# Patient Record
Sex: FEMALE | Race: WHITE | NOT HISPANIC OR LATINO
[De-identification: names, ages, dates, MRNs, and addresses within clinical notes are randomized per-mention and may not be internally consistent; named-entity substitution may affect disease eponyms.]

---

## 2018-08-13 PROBLEM — Z00.00 ENCOUNTER FOR PREVENTIVE HEALTH EXAMINATION: Status: ACTIVE | Noted: 2018-08-13

## 2018-08-15 ENCOUNTER — RECORD ABSTRACTING (OUTPATIENT)
Age: 83
End: 2018-08-15

## 2018-08-15 DIAGNOSIS — Z82.61 FAMILY HISTORY OF ARTHRITIS: ICD-10-CM

## 2018-08-15 DIAGNOSIS — Z87.59 PERSONAL HISTORY OF OTHER COMPLICATIONS OF PREGNANCY, CHILDBIRTH AND THE PUERPERIUM: ICD-10-CM

## 2018-08-15 DIAGNOSIS — Z84.1 FAMILY HISTORY OF DISORDERS OF KIDNEY AND URETER: ICD-10-CM

## 2018-08-15 DIAGNOSIS — K62.3 RECTAL PROLAPSE: ICD-10-CM

## 2018-08-15 DIAGNOSIS — Z87.891 PERSONAL HISTORY OF NICOTINE DEPENDENCE: ICD-10-CM

## 2018-08-15 DIAGNOSIS — Z86.19 PERSONAL HISTORY OF OTHER INFECTIOUS AND PARASITIC DISEASES: ICD-10-CM

## 2018-08-15 DIAGNOSIS — Z87.81 PERSONAL HISTORY OF (HEALED) TRAUMATIC FRACTURE: ICD-10-CM

## 2018-08-15 RX ORDER — ASPIRIN 81 MG
81 TABLET, DELAYED RELEASE (ENTERIC COATED) ORAL
Refills: 0 | Status: ACTIVE | COMMUNITY

## 2018-08-15 RX ORDER — BLOOD SUGAR DIAGNOSTIC
STRIP MISCELLANEOUS
Refills: 0 | Status: ACTIVE | COMMUNITY

## 2018-08-15 RX ORDER — SYRINGE AND NEEDLE,INSULIN,1ML 31 GX5/16"
SYRINGE, EMPTY DISPOSABLE MISCELLANEOUS
Refills: 0 | Status: ACTIVE | COMMUNITY

## 2018-08-15 RX ORDER — PNV NO.95/FERROUS FUM/FOLIC AC 28MG-0.8MG
325 TABLET ORAL
Refills: 0 | Status: ACTIVE | COMMUNITY

## 2018-08-15 RX ORDER — PANTOPRAZOLE 40 MG/1
40 TABLET, DELAYED RELEASE ORAL
Refills: 0 | Status: ACTIVE | COMMUNITY

## 2018-08-20 ENCOUNTER — APPOINTMENT (OUTPATIENT)
Age: 83
End: 2018-08-20

## 2018-08-28 ENCOUNTER — RECORD ABSTRACTING (OUTPATIENT)
Age: 83
End: 2018-08-28

## 2018-08-28 ENCOUNTER — APPOINTMENT (OUTPATIENT)
Dept: ENDOCRINOLOGY | Facility: CLINIC | Age: 83
End: 2018-08-28
Payer: MEDICARE

## 2018-08-28 VITALS
BODY MASS INDEX: 18.61 KG/M2 | DIASTOLIC BLOOD PRESSURE: 68 MMHG | WEIGHT: 109 LBS | SYSTOLIC BLOOD PRESSURE: 128 MMHG | HEIGHT: 64 IN | HEART RATE: 91 BPM

## 2018-08-28 LAB — GLUCOSE BLDC GLUCOMTR-MCNC: 161

## 2018-08-28 PROCEDURE — 82962 GLUCOSE BLOOD TEST: CPT

## 2018-08-28 PROCEDURE — 99214 OFFICE O/P EST MOD 30 MIN: CPT | Mod: 25

## 2018-12-11 ENCOUNTER — RECORD ABSTRACTING (OUTPATIENT)
Age: 83
End: 2018-12-11

## 2019-02-08 ENCOUNTER — LABORATORY RESULT (OUTPATIENT)
Age: 84
End: 2019-02-08

## 2019-02-08 ENCOUNTER — APPOINTMENT (OUTPATIENT)
Dept: ENDOCRINOLOGY | Facility: CLINIC | Age: 84
End: 2019-02-08
Payer: MEDICARE

## 2019-02-08 VITALS
SYSTOLIC BLOOD PRESSURE: 122 MMHG | OXYGEN SATURATION: 97 % | BODY MASS INDEX: 18.61 KG/M2 | HEART RATE: 86 BPM | HEIGHT: 64 IN | DIASTOLIC BLOOD PRESSURE: 80 MMHG | WEIGHT: 109 LBS

## 2019-02-08 LAB — GLUCOSE BLDC GLUCOMTR-MCNC: 137

## 2019-02-08 PROCEDURE — 99214 OFFICE O/P EST MOD 30 MIN: CPT | Mod: 25

## 2019-02-08 PROCEDURE — 82962 GLUCOSE BLOOD TEST: CPT

## 2019-02-08 NOTE — HISTORY OF PRESENT ILLNESS
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 difficile colitis\par Hypothyroidism on Synthroid 0.112\par Status post CVA causing left arm weakness

## 2019-02-08 NOTE — ASSESSMENT
[FreeTextEntry1] : DM type 2, likely controlled, needs verification by lab tests.\par Labs pending to check thyroid levels\par Remains on rx for osteoporosis

## 2019-02-08 NOTE — PHYSICAL EXAM
[Thyroid Not Enlarged] : the thyroid was not enlarged [Clear to Auscultation] : lungs were clear to auscultation bilaterally [Regular Rhythm] : with a regular rhythm [de-identified] : frail appearing

## 2019-02-09 LAB
ALBUMIN SERPL ELPH-MCNC: 4.3 G/DL
ALP BLD-CCNC: 82 U/L
ALT SERPL-CCNC: 15 U/L
ANION GAP SERPL CALC-SCNC: 16 MMOL/L
AST SERPL-CCNC: 17 U/L
BASOPHILS # BLD AUTO: 0.04 K/UL
BASOPHILS NFR BLD AUTO: 0.5 %
BILIRUB SERPL-MCNC: 0.2 MG/DL
BUN SERPL-MCNC: 33 MG/DL
CALCIUM SERPL-MCNC: 9.7 MG/DL
CHLORIDE SERPL-SCNC: 105 MMOL/L
CHOLEST SERPL-MCNC: 166 MG/DL
CHOLEST/HDLC SERPL: 4.3 RATIO
CO2 SERPL-SCNC: 18 MMOL/L
CREAT SERPL-MCNC: 0.85 MG/DL
EOSINOPHIL # BLD AUTO: 0.4 K/UL
EOSINOPHIL NFR BLD AUTO: 5 %
GLUCOSE SERPL-MCNC: 137 MG/DL
HBA1C MFR BLD HPLC: 6.8 %
HCT VFR BLD CALC: 33.7 %
HDLC SERPL-MCNC: 39 MG/DL
HGB BLD-MCNC: 10 G/DL
IMM GRANULOCYTES NFR BLD AUTO: 0.1 %
LDLC SERPL CALC-MCNC: 95 MG/DL
LYMPHOCYTES # BLD AUTO: 1.17 K/UL
LYMPHOCYTES NFR BLD AUTO: 14.6 %
MAN DIFF?: NORMAL
MCHC RBC-ENTMCNC: 29.7 GM/DL
MCHC RBC-ENTMCNC: 29.9 PG
MCV RBC AUTO: 100.9 FL
MONOCYTES # BLD AUTO: 0.79 K/UL
MONOCYTES NFR BLD AUTO: 9.8 %
NEUTROPHILS # BLD AUTO: 5.62 K/UL
NEUTROPHILS NFR BLD AUTO: 70 %
PLATELET # BLD AUTO: 369 K/UL
POTASSIUM SERPL-SCNC: 4.5 MMOL/L
PROT SERPL-MCNC: 7 G/DL
RBC # BLD: 3.34 M/UL
RBC # FLD: 16.4 %
SODIUM SERPL-SCNC: 139 MMOL/L
TRIGL SERPL-MCNC: 161 MG/DL
WBC # FLD AUTO: 8.03 K/UL

## 2019-02-10 LAB
T3RU NFR SERPL: 0.78 INDEX
T4 SERPL-MCNC: 5.1 UG/DL
TSH SERPL-ACNC: 0.61 UIU/ML

## 2019-05-02 ENCOUNTER — RX RENEWAL (OUTPATIENT)
Age: 84
End: 2019-05-02

## 2019-05-31 ENCOUNTER — LABORATORY RESULT (OUTPATIENT)
Age: 84
End: 2019-05-31

## 2019-05-31 ENCOUNTER — APPOINTMENT (OUTPATIENT)
Dept: ENDOCRINOLOGY | Facility: CLINIC | Age: 84
End: 2019-05-31
Payer: MEDICARE

## 2019-05-31 VITALS
BODY MASS INDEX: 19.97 KG/M2 | HEIGHT: 64 IN | HEART RATE: 70 BPM | DIASTOLIC BLOOD PRESSURE: 50 MMHG | SYSTOLIC BLOOD PRESSURE: 110 MMHG | WEIGHT: 117 LBS

## 2019-05-31 LAB — GLUCOSE BLDC GLUCOMTR-MCNC: 286

## 2019-05-31 PROCEDURE — 36415 COLL VENOUS BLD VENIPUNCTURE: CPT

## 2019-05-31 PROCEDURE — 82962 GLUCOSE BLOOD TEST: CPT

## 2019-05-31 PROCEDURE — 99214 OFFICE O/P EST MOD 30 MIN: CPT | Mod: 25

## 2019-05-31 RX ORDER — ZOLPIDEM TARTRATE 5 MG/1
5 TABLET ORAL
Refills: 0 | Status: DISCONTINUED | COMMUNITY
End: 2019-05-31

## 2019-05-31 RX ORDER — ALENDRONATE SODIUM 70 MG/1
70 TABLET ORAL
Refills: 0 | Status: DISCONTINUED | COMMUNITY
End: 2019-05-31

## 2019-05-31 RX ORDER — GLIPIZIDE AND METFORMIN HYDROCHLORIDE 5; 500 MG/1; MG/1
5-500 TABLET, FILM COATED ORAL
Refills: 0 | Status: DISCONTINUED | COMMUNITY
End: 2019-05-31

## 2019-05-31 RX ORDER — BLOOD SUGAR DIAGNOSTIC
STRIP MISCELLANEOUS
Refills: 0 | Status: DISCONTINUED | COMMUNITY
End: 2019-05-31

## 2019-05-31 RX ORDER — LANCETS 33 GAUGE
EACH MISCELLANEOUS
Refills: 0 | Status: DISCONTINUED | COMMUNITY
End: 2019-05-31

## 2019-05-31 NOTE — ASSESSMENT
[FreeTextEntry1] : DM type 2; possible loss of control due to discontinuation of metformin. Will check labs to see if metformin needs to be resumed, possibly in the ER version\par hypothyroid, clinically euthyroid;\par update thyroid levels

## 2019-05-31 NOTE — HISTORY OF PRESENT ILLNESS
[FreeTextEntry1] : \par Her current diabetic regimen is as follows:\par glipizide/ metformin 5/500 b.i.d. - metformin stopoped due to diarrhea, but with unclear reduction in symptoms\par januvia 100 mg daily\par levemir 10 units daily\par She was on Actos in the past \par \par s/p left foot infection requiring amputation of first toe\par moved to assisted living\par \par past medical history\par Osteoporosis with compression fracture in the spine\par ASHD, status post TAVR.\par Status post C. difficile colitis\par Hypothyroidism on Synthroid 0.112\par Status post CVA causing left arm weakness

## 2019-06-05 LAB
ALBUMIN SERPL ELPH-MCNC: 4.1 G/DL
ALP BLD-CCNC: 64 U/L
ALT SERPL-CCNC: 26 U/L
ANION GAP SERPL CALC-SCNC: 13 MMOL/L
AST SERPL-CCNC: 18 U/L
BASOPHILS # BLD AUTO: 0.07 K/UL
BASOPHILS NFR BLD AUTO: 0.9 %
BILIRUB SERPL-MCNC: 0.2 MG/DL
BUN SERPL-MCNC: 20 MG/DL
CALCIUM SERPL-MCNC: 9.5 MG/DL
CHLORIDE SERPL-SCNC: 104 MMOL/L
CHOLEST SERPL-MCNC: 184 MG/DL
CHOLEST/HDLC SERPL: 5.6 RATIO
CO2 SERPL-SCNC: 23 MMOL/L
CREAT SERPL-MCNC: 0.72 MG/DL
EOSINOPHIL # BLD AUTO: 0.54 K/UL
EOSINOPHIL NFR BLD AUTO: 7 %
ESTIMATED AVERAGE GLUCOSE: 128 MG/DL
GLUCOSE SERPL-MCNC: 260 MG/DL
HBA1C MFR BLD HPLC: 6.1 %
HCT VFR BLD CALC: 29.9 %
HDLC SERPL-MCNC: 33 MG/DL
HGB BLD-MCNC: 9 G/DL
LDLC SERPL CALC-MCNC: 111 MG/DL
LYMPHOCYTES # BLD AUTO: 1.02 K/UL
LYMPHOCYTES NFR BLD AUTO: 13.2 %
MAN DIFF?: NORMAL
MCHC RBC-ENTMCNC: 30.1 GM/DL
MCHC RBC-ENTMCNC: 33.3 PG
MCV RBC AUTO: 110.7 FL
MONOCYTES # BLD AUTO: 0.27 K/UL
MONOCYTES NFR BLD AUTO: 3.5 %
NEUTROPHILS # BLD AUTO: 5.83 K/UL
NEUTROPHILS NFR BLD AUTO: 75.4 %
PLATELET # BLD AUTO: 253 K/UL
POTASSIUM SERPL-SCNC: 5 MMOL/L
PROT SERPL-MCNC: 6.7 G/DL
RBC # BLD: 2.7 M/UL
RBC # FLD: 16.8 %
SODIUM SERPL-SCNC: 140 MMOL/L
T3RU NFR SERPL: 0.8 TBI
T4 SERPL-MCNC: 5.7 UG/DL
TRIGL SERPL-MCNC: 200 MG/DL
TSH SERPL-ACNC: 1.45 UIU/ML
WBC # FLD AUTO: 7.73 K/UL

## 2019-07-10 ENCOUNTER — MEDICATION RENEWAL (OUTPATIENT)
Age: 84
End: 2019-07-10

## 2019-07-11 ENCOUNTER — MEDICATION RENEWAL (OUTPATIENT)
Age: 84
End: 2019-07-11

## 2019-07-11 RX ORDER — LANCETS 28 GAUGE
EACH MISCELLANEOUS
Qty: 200 | Refills: 1 | Status: ACTIVE | COMMUNITY

## 2019-07-11 RX ORDER — BLOOD-GLUCOSE METER
KIT MISCELLANEOUS
Qty: 1 | Refills: 0 | Status: ACTIVE | COMMUNITY
Start: 2019-07-10 | End: 1900-01-01

## 2019-10-04 ENCOUNTER — APPOINTMENT (OUTPATIENT)
Dept: ENDOCRINOLOGY | Facility: CLINIC | Age: 84
End: 2019-10-04
Payer: MEDICARE

## 2019-10-04 VITALS
SYSTOLIC BLOOD PRESSURE: 112 MMHG | DIASTOLIC BLOOD PRESSURE: 70 MMHG | OXYGEN SATURATION: 96 % | HEIGHT: 63 IN | BODY MASS INDEX: 20.38 KG/M2 | HEART RATE: 61 BPM | WEIGHT: 115 LBS

## 2019-10-04 LAB — GLUCOSE BLDC GLUCOMTR-MCNC: 194

## 2019-10-04 PROCEDURE — 82962 GLUCOSE BLOOD TEST: CPT

## 2019-10-04 PROCEDURE — 99214 OFFICE O/P EST MOD 30 MIN: CPT | Mod: 25

## 2019-10-04 RX ORDER — MIRABEGRON 50 MG/1
50 TABLET, FILM COATED, EXTENDED RELEASE ORAL
Refills: 0 | Status: ACTIVE | COMMUNITY

## 2019-10-04 RX ORDER — GABAPENTIN 300 MG
300 TABLET ORAL
Refills: 0 | Status: ACTIVE | COMMUNITY

## 2019-10-04 RX ORDER — LEVOTHYROXINE SODIUM 0.1 MG/1
100 TABLET ORAL
Refills: 0 | Status: ACTIVE | COMMUNITY

## 2019-10-04 RX ORDER — CLOPIDOGREL BISULFATE 75 MG/1
75 TABLET, FILM COATED ORAL
Refills: 0 | Status: ACTIVE | COMMUNITY

## 2019-10-04 NOTE — HISTORY OF PRESENT ILLNESS
[FreeTextEntry1] : \par Her current diabetic regimen is as follows:\par glipizide/ metformin 5/500 b.i.d. - metformin stopped due to diarrhea, but with unclear reduction in symptoms\par januvia 50 mg daily\par levemir 10 units daily\par glipizide ER 5 tid\par She was on Actos in the past \par \par s/p left foot infection requiring amputation of first toe\par moved to assisted living\par \par past medical history\par Osteoporosis with compression fracture in the spine\par ASHD, status post TAVR.\par Status post C. difficile colitis\par Hypothyroidism on Synthroid 0.1\par Status post CVA causing left arm weakness

## 2019-10-04 NOTE — ASSESSMENT
[FreeTextEntry1] : DM type 2, stable control but with elevation post-supper. Advise increase in januvia to 100 mg daily\par hypothyroid, clinically euthyroid on replacement\par \par updated labs requested

## 2019-11-12 ENCOUNTER — MEDICATION RENEWAL (OUTPATIENT)
Age: 84
End: 2019-11-12

## 2019-12-30 ENCOUNTER — RX RENEWAL (OUTPATIENT)
Age: 84
End: 2019-12-30

## 2019-12-30 RX ORDER — BLOOD SUGAR DIAGNOSTIC
STRIP MISCELLANEOUS
Qty: 100 | Refills: 2 | Status: ACTIVE | COMMUNITY
Start: 2019-05-02 | End: 1900-01-01

## 2020-01-17 ENCOUNTER — RX RENEWAL (OUTPATIENT)
Age: 85
End: 2020-01-17

## 2020-02-28 ENCOUNTER — APPOINTMENT (OUTPATIENT)
Dept: ENDOCRINOLOGY | Facility: CLINIC | Age: 85
End: 2020-02-28
Payer: MEDICARE

## 2020-02-28 VITALS
DIASTOLIC BLOOD PRESSURE: 64 MMHG | SYSTOLIC BLOOD PRESSURE: 110 MMHG | WEIGHT: 115 LBS | HEIGHT: 63 IN | HEART RATE: 60 BPM | BODY MASS INDEX: 20.38 KG/M2

## 2020-02-28 LAB — GLUCOSE BLDC GLUCOMTR-MCNC: 167

## 2020-02-28 PROCEDURE — 99214 OFFICE O/P EST MOD 30 MIN: CPT | Mod: 25

## 2020-02-28 PROCEDURE — 82962 GLUCOSE BLOOD TEST: CPT

## 2020-02-28 RX ORDER — FAMOTIDINE 20 MG/1
20 TABLET, FILM COATED ORAL
Refills: 0 | Status: ACTIVE | COMMUNITY

## 2020-02-28 RX ORDER — FUROSEMIDE 40 MG/1
40 TABLET ORAL
Refills: 0 | Status: ACTIVE | COMMUNITY

## 2020-02-28 RX ORDER — METOPROLOL SUCCINATE 50 MG/1
50 TABLET, EXTENDED RELEASE ORAL
Refills: 0 | Status: ACTIVE | COMMUNITY

## 2020-02-28 NOTE — HISTORY OF PRESENT ILLNESS
[FreeTextEntry1] : \par Her current diabetic regimen is as follows:\par glipizide/ metformin 5/500 b.i.d. - metformin stopped due to diarrhea, but with unclear reduction in symptoms\par januvia 50 mg daily\par levemir 20 units daily\par glipizide ER 5 tid\par She was on Actos in the past \par \par s/p left foot infection requiring amputation of first toe\par moved to assisted living\par \par past medical history\par Osteoporosis with compression fracture in the spine\par ASHD, status post TAVR.\par Status post C. difficile colitis\par Hypothyroidism on Synthroid 0.1\par Status post CVA causing left arm weakness

## 2020-02-28 NOTE — ASSESSMENT
[FreeTextEntry1] : DM type 2, likely requiring adjustments in regimen. Suspect basal insulin dose excessive.\par hypothyroid, clinically euthyroid on replacement\par \par updated labs and glucose review requested

## 2020-02-28 NOTE — PHYSICAL EXAM
[Thyroid Not Enlarged] : the thyroid was not enlarged [Regular Rhythm] : with a regular rhythm [Clear to Auscultation] : lungs were clear to auscultation bilaterally [de-identified] : frail, facial ecchymoses

## 2020-03-05 ENCOUNTER — APPOINTMENT (OUTPATIENT)
Dept: ENDOCRINOLOGY | Facility: CLINIC | Age: 85
End: 2020-03-05

## 2020-04-15 ENCOUNTER — RX RENEWAL (OUTPATIENT)
Age: 85
End: 2020-04-15

## 2020-06-05 ENCOUNTER — APPOINTMENT (OUTPATIENT)
Dept: ENDOCRINOLOGY | Facility: CLINIC | Age: 85
End: 2020-06-05
Payer: MEDICARE

## 2020-06-05 VITALS
HEIGHT: 63 IN | HEART RATE: 58 BPM | BODY MASS INDEX: 18.61 KG/M2 | WEIGHT: 105 LBS | DIASTOLIC BLOOD PRESSURE: 70 MMHG | SYSTOLIC BLOOD PRESSURE: 118 MMHG

## 2020-06-05 LAB — GLUCOSE BLDC GLUCOMTR-MCNC: 363

## 2020-06-05 PROCEDURE — 82962 GLUCOSE BLOOD TEST: CPT

## 2020-06-05 PROCEDURE — 99214 OFFICE O/P EST MOD 30 MIN: CPT | Mod: 25

## 2020-06-05 NOTE — ASSESSMENT
[FreeTextEntry1] : DM type 2, with morning borderline hypoglycemia. Will decrease levemir further to 14 units\par hypothyroid, clinically euthyroid on replacement\par \par

## 2020-06-05 NOTE — HISTORY OF PRESENT ILLNESS
[FreeTextEntry1] : \par Her current diabetic regimen is as follows:\par \par januvia 100 mg daily\par levemir 16 units daily\par glipizide ER 5 bid\par She was on Actos in the past \par \par s/p left foot infection requiring amputation of first toe\par moved to assisted living\par \par past medical history\par Osteoporosis with compression fracture in the spine\par ASHD, status post TAVR.\par Status post C. difficile colitis\par Hypothyroidism on Synthroid 0.1\par Status post CVA causing left arm weakness

## 2020-06-05 NOTE — PHYSICAL EXAM
[Alert] : alert [No Thyroid Nodules] : no palpable thyroid nodules [No Acute Distress] : no acute distress [Clear to Auscultation] : lungs were clear to auscultation bilaterally [Normal Rate] : heart rate was normal

## 2020-08-27 ENCOUNTER — RX RENEWAL (OUTPATIENT)
Age: 85
End: 2020-08-27

## 2020-08-27 RX ORDER — GLIPIZIDE 5 MG/1
5 TABLET, FILM COATED, EXTENDED RELEASE ORAL
Qty: 270 | Refills: 1 | Status: ACTIVE | COMMUNITY
Start: 2020-01-17 | End: 1900-01-01

## 2020-09-09 ENCOUNTER — RX RENEWAL (OUTPATIENT)
Age: 85
End: 2020-09-09

## 2020-10-07 ENCOUNTER — APPOINTMENT (OUTPATIENT)
Dept: ENDOCRINOLOGY | Facility: CLINIC | Age: 85
End: 2020-10-07
Payer: MEDICARE

## 2020-10-07 PROCEDURE — 99442: CPT | Mod: 95

## 2020-10-07 NOTE — ASSESSMENT
[FreeTextEntry1] : DM type 2, insulin treated. Review of meds and glucose levels needed, as well as lab review to determine treatment needs.

## 2020-10-07 NOTE — HISTORY OF PRESENT ILLNESS
[Other Location: e.g. School (Enter Location, City,State)___] : at [unfilled], at the time of the visit. [Other Location: e.g. 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 difficile colitis\par Hypothyroidism on Synthroid 0.1\par Status post CVA causing left arm weakness

## 2020-10-26 ENCOUNTER — RX RENEWAL (OUTPATIENT)
Age: 85
End: 2020-10-26

## 2020-12-07 ENCOUNTER — RX RENEWAL (OUTPATIENT)
Age: 85
End: 2020-12-07

## 2021-02-05 ENCOUNTER — APPOINTMENT (OUTPATIENT)
Dept: ENDOCRINOLOGY | Facility: CLINIC | Age: 86
End: 2021-02-05
Payer: MEDICARE

## 2021-02-05 ENCOUNTER — RESULT CHARGE (OUTPATIENT)
Age: 86
End: 2021-02-05

## 2021-02-05 VITALS
WEIGHT: 110 LBS | BODY MASS INDEX: 19.49 KG/M2 | SYSTOLIC BLOOD PRESSURE: 122 MMHG | DIASTOLIC BLOOD PRESSURE: 74 MMHG | HEIGHT: 63 IN | HEART RATE: 76 BPM

## 2021-02-05 LAB — GLUCOSE BLDC GLUCOMTR-MCNC: 384

## 2021-02-05 PROCEDURE — 82962 GLUCOSE BLOOD TEST: CPT

## 2021-02-05 PROCEDURE — 99214 OFFICE O/P EST MOD 30 MIN: CPT | Mod: 25

## 2021-02-05 RX ORDER — FAMOTIDINE 20 MG/1
20 TABLET, FILM COATED ORAL
Refills: 0 | Status: ACTIVE | COMMUNITY

## 2021-02-05 RX ORDER — FOLIC ACID 1 MG/1
1 TABLET ORAL
Refills: 0 | Status: ACTIVE | COMMUNITY

## 2021-02-05 RX ORDER — FUROSEMIDE 20 MG/1
20 TABLET ORAL
Refills: 0 | Status: ACTIVE | COMMUNITY

## 2021-02-05 RX ORDER — CHROMIUM 200 MCG
TABLET ORAL
Refills: 0 | Status: ACTIVE | COMMUNITY

## 2021-02-05 NOTE — REVIEW OF SYSTEMS
[Recent Weight Gain (___ Lbs)] : recent weight gain: [unfilled] lbs [Recent Weight Loss (___ Lbs)] : recent weight loss: [unfilled] lbs [Fatigue] : no fatigue [Decreased Appetite] : appetite not decreased [Visual Field Defect] : no visual field defect [Blurred Vision] : no blurred vision [Dysphagia] : no dysphagia [Neck Pain] : no neck pain [Dysphonia] : no dysphonia [Chest Pain] : no chest pain [Palpitations] : no palpitations [Constipation] : no constipation [Diarrhea] : no diarrhea [Polyuria] : no polyuria [Dysuria] : no dysuria [Headaches] : no headaches [Tremors] : no tremors [Depression] : no depression [Anxiety] : no anxiety [Polydipsia] : no polydipsia [Swelling] : no swelling

## 2021-02-05 NOTE — ASSESSMENT
[FreeTextEntry1] : 89 y/o female with Type 2 DM, Hypothyroidism, and Hypercholesterolemia.\par \par Plan: \par Type 2 DM: elevated blood sugars most likely r/t foot infection \par - increase Levemir to 16 units at bedtime\par - requested recent labs and blood sugar logs from [facility] living \par \par Hypothyroidism: check TFTs now\par - continue current dose of LT4 \par \par Hypercholesterolemia: check lipids now, continue to follow a low fat diet \par \par RTO in 2 weeks to see CDE for blood sugar log review then another 4 weeks in office. \par

## 2021-02-05 NOTE — PHYSICAL EXAM
[Alert] : alert [No Acute Distress] : no acute distress [Normal Sclera/Conjunctiva] : normal sclera/conjunctiva [EOMI] : extra ocular movement intact [No LAD] : no lymphadenopathy [Thyroid Not Enlarged] : the thyroid was not enlarged [No Thyroid Nodules] : no palpable thyroid nodules [No Respiratory Distress] : no respiratory distress [No Accessory Muscle Use] : no accessory muscle use [Normal Rate and Effort] : normal respiratory rate and effort [Clear to Auscultation] : lungs were clear to auscultation bilaterally [Normal S1, S2] : normal S1 and S2 [Normal Rate] : heart rate was normal [Regular Rhythm] : with a regular rhythm [No Edema] : no peripheral edema [Normal Bowel Sounds] : normal bowel sounds [Not Tender] : non-tender [Soft] : abdomen soft [No Rash] : no rash [Acanthosis Nigricans] : no acanthosis nigricans [No Tremors] : no tremors [Oriented x3] : oriented to person, place, and time [Normal Affect] : the affect was normal [Normal Insight/Judgement] : insight and judgment were intact [Normal Mood] : the mood was normal [de-identified] : frail  [de-identified] : ambulates with rolling walker

## 2021-02-05 NOTE — HISTORY OF PRESENT ILLNESS
[FreeTextEntry1] : Lives at [facility] living [facility] \par Pt. reports she just finished Keflex for foot infection \par \par Quality: Type 2 DM\par Severity: moderate \par Modifying Factors: Better with medication \par \par Current Regimen:\par januvia 100 mg daily\par levemir 14 units daily\par glipizide ER 5 bid\par She was on Actos in the past \par \par Self blood sugar monitoring: 3 times a day, no logs or meter\par current \par \par exercise: none\par \par Diet: 3 meals a day\par \par \par Date of last eye exam: none recently (-) DR\par Date of last foot exam: unsure, s/p left foot infection requiring amputation of first toe\par Date of last flu vaccine: 2020\par Date of last Pneumovax: 2020\par COVID: 2020 received both injections\par \par past medical history\par Osteoporosis with compression fracture in the spine\par ASHD, status post TAVR.\par Status post C. difficile colitis\par Hypothyroidism on Synthroid 0.1\par Status post CVA causing left arm weakness

## 2021-02-23 ENCOUNTER — APPOINTMENT (OUTPATIENT)
Dept: ENDOCRINOLOGY | Facility: CLINIC | Age: 86
End: 2021-02-23
Payer: MEDICARE

## 2021-02-23 DIAGNOSIS — E78.00 PURE HYPERCHOLESTEROLEMIA, UNSPECIFIED: ICD-10-CM

## 2021-02-23 LAB
HBA1C MFR BLD HPLC: 8.5
LDLC SERPL DIRECT ASSAY-MCNC: 53

## 2021-02-23 PROCEDURE — 99443: CPT | Mod: 95

## 2021-02-23 NOTE — HISTORY OF PRESENT ILLNESS
[Other Location: e.g. School (Enter Location, City,State)___] : at [unfilled], at the time of the visit. [Other Location: e.g. Home (Enter Location, City,State)___] : at [unfilled] [Verbal consent obtained from patient] : the patient, [unfilled] [FreeTextEntry1] : Lives at [facility] \par Pt. reports she just finished Keflex for foot infection \par \par Quality: Type 2 DM\par Severity: moderate \par Modifying Factors: Better with medication \par \par Current Regimen:\par januvia 100 mg daily\par levemir 16 units daily\par glipizide ER 5 bid\par She was on Actos in the past \par \par Self blood sugar monitoring: 3 times a day, no logs or meter - will obtain from [facility] \par this morning \par \par exercise: none\par \par Diet: 3 meals a day\par \par \par Date of last eye exam: none recently (-) DR\par Date of last foot exam: unsure, s/p left foot infection requiring amputation of first toe, \par Date of last flu vaccine: 2020\par Date of last Pneumovax: 2020\par COVID: 2020 received both injections\par \par past medical history\par Osteoporosis with compression fracture in the spine\par ASHD, status post TAVR.\par Status post C. difficile colitis\par Hypothyroidism on Synthroid 0.1\par Status post CVA causing left arm weakness

## 2021-02-23 NOTE — REVIEW OF SYSTEMS
[Incontinence] : incontinence [Fatigue] : no fatigue [Decreased Appetite] : appetite not decreased [Recent Weight Gain (___ Lbs)] : no recent weight gain [Recent Weight Loss (___ Lbs)] : no recent weight loss [Visual Field Defect] : no visual field defect [Blurred Vision] : no blurred vision [Dysphagia] : no dysphagia [Neck Pain] : no neck pain [Dysphonia] : no dysphonia [Chest Pain] : no chest pain [Palpitations] : no palpitations [Constipation] : no constipation [Diarrhea] : no diarrhea [Polyuria] : no polyuria [Dysuria] : no dysuria [Polydipsia] : no polydipsia [FreeTextEntry2] : weight stable

## 2021-02-23 NOTE — ASSESSMENT
[FreeTextEntry1] : 89 y/o female with Type 2 DM, Hypothyroidism, and Hypercholesterolemia.\par \par Plan: \par Type 2 DM: elevated blood sugars most likely r/t foot infection \par - continue current Rx\par - [facility]\par \par Hypothyroidism: TFTs therapeutic on replacement \par - continue current dose of LT4 \par \par Hypercholesterolemia: controlled, continue to follow a low fat diet \par \par RTO in this week with CDE for blood sugar log review. Another visit 3 months.  \par \par Pt. verbalized understanding of plan. \par \par Start Time: 11:35\par End Time: 11: 56

## 2021-02-26 ENCOUNTER — APPOINTMENT (OUTPATIENT)
Dept: ENDOCRINOLOGY | Facility: CLINIC | Age: 86
End: 2021-02-26
Payer: MEDICARE

## 2021-02-26 DIAGNOSIS — E11.40 TYPE 2 DIABETES MELLITUS WITH DIABETIC NEUROPATHY, UNSPECIFIED: ICD-10-CM

## 2021-02-26 PROCEDURE — 97803 MED NUTRITION INDIV SUBSEQ: CPT

## 2021-04-23 ENCOUNTER — APPOINTMENT (OUTPATIENT)
Dept: ENDOCRINOLOGY | Facility: CLINIC | Age: 86
End: 2021-04-23
Payer: MEDICARE

## 2021-04-23 VITALS
HEART RATE: 57 BPM | WEIGHT: 120 LBS | DIASTOLIC BLOOD PRESSURE: 62 MMHG | BODY MASS INDEX: 21.26 KG/M2 | HEIGHT: 63 IN | SYSTOLIC BLOOD PRESSURE: 118 MMHG

## 2021-04-23 DIAGNOSIS — M81.0 AGE-RELATED OSTEOPOROSIS W/OUT CURRENT PATHOLOGICAL FRACTURE: ICD-10-CM

## 2021-04-23 DIAGNOSIS — E03.9 HYPOTHYROIDISM, UNSPECIFIED: ICD-10-CM

## 2021-04-23 DIAGNOSIS — E11.65 TYPE 2 DIABETES MELLITUS WITH HYPERGLYCEMIA: ICD-10-CM

## 2021-04-23 LAB — GLUCOSE BLDC GLUCOMTR-MCNC: 303

## 2021-04-23 PROCEDURE — 82962 GLUCOSE BLOOD TEST: CPT

## 2021-04-23 PROCEDURE — 99214 OFFICE O/P EST MOD 30 MIN: CPT | Mod: 25

## 2021-04-23 NOTE — HISTORY OF PRESENT ILLNESS
[FreeTextEntry1] : Lives at [facility] \par \par \par Quality: Type 2 DM\par Severity: moderate \par Modifying Factors: Better with medication \par \par Current Regimen:\par januvia 100 mg daily\par levemir 16 units daily\par glipizide ER 5 bid\par She was on Actos in the past \par \par Self blood sugar monitoring: 3 times a day, no logs or meter - will obtain from [facility] 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 Getting prolia by oncology\par ASHD, status post TAVR.\par Status post C. difficile colitis\par Hypothyroidism on Synthroid 0.1\par Status post CVA causing left arm weakness

## 2021-04-23 NOTE — ADDENDUM
[FreeTextEntry1] : Glucose logs reviewed. Pt. currently on levemir 4 units am and 16 units HS. Episodes of morning hypoglycemia noted. Advise reduction in levemir to 14 units HS.

## 2021-04-23 NOTE — ASSESSMENT
[FreeTextEntry1] : DM type 2, insulin treated, likely in suboptimal control, but not a candidate for tight control due to risk of hypoglycemia. Will need to review records.\par Hypothyroid, euthyroid on replacement.\par Osteoporosis, on therapy

## 2021-04-27 ENCOUNTER — NON-APPOINTMENT (OUTPATIENT)
Age: 86
End: 2021-04-27

## 2021-07-07 ENCOUNTER — RX RENEWAL (OUTPATIENT)
Age: 86
End: 2021-07-07

## 2021-07-07 RX ORDER — INSULIN DETEMIR 100 [IU]/ML
100 INJECTION, SOLUTION SUBCUTANEOUS
Qty: 1 | Refills: 1 | Status: ACTIVE | COMMUNITY
Start: 2020-09-09 | End: 1900-01-01

## 2021-08-09 ENCOUNTER — RX RENEWAL (OUTPATIENT)
Age: 86
End: 2021-08-09

## 2021-08-27 ENCOUNTER — APPOINTMENT (OUTPATIENT)
Dept: ENDOCRINOLOGY | Facility: CLINIC | Age: 86
End: 2021-08-27

## 2022-04-17 ENCOUNTER — RX RENEWAL (OUTPATIENT)
Age: 87
End: 2022-04-17

## 2022-10-28 ENCOUNTER — RX RENEWAL (OUTPATIENT)
Age: 87
End: 2022-10-28

## 2023-02-13 ENCOUNTER — APPOINTMENT (OUTPATIENT)
Dept: CT IMAGING | Facility: CLINIC | Age: 88
End: 2023-02-13
Payer: MEDICARE

## 2023-02-13 ENCOUNTER — OUTPATIENT (OUTPATIENT)
Dept: OUTPATIENT SERVICES | Facility: HOSPITAL | Age: 88
LOS: 1 days | End: 2023-02-13
Payer: MEDICARE

## 2023-02-13 DIAGNOSIS — R19.5 OTHER FECAL ABNORMALITIES: ICD-10-CM

## 2023-02-13 DIAGNOSIS — R19.8 OTHER SPECIFIED SYMPTOMS AND SIGNS INVOLVING THE DIGESTIVE SYSTEM AND ABDOMEN: ICD-10-CM

## 2023-02-13 DIAGNOSIS — R13.10 DYSPHAGIA, UNSPECIFIED: ICD-10-CM

## 2023-02-13 DIAGNOSIS — R19.4 CHANGE IN BOWEL HABIT: ICD-10-CM

## 2023-02-13 PROCEDURE — 74263 CT COLONOGRAPHY SCREENING: CPT | Mod: 26

## 2023-02-13 PROCEDURE — 74263 CT COLONOGRAPHY SCREENING: CPT

## 2023-02-13 PROCEDURE — 74261 CT COLONOGRAPHY DX: CPT

## 2023-11-05 ENCOUNTER — RX RENEWAL (OUTPATIENT)
Age: 88
End: 2023-11-05

## 2023-11-05 RX ORDER — SITAGLIPTIN 100 MG/1
100 TABLET, FILM COATED ORAL DAILY
Qty: 90 | Refills: 2 | Status: ACTIVE | COMMUNITY
Start: 2020-10-26 | End: 1900-01-01